# Patient Record
(demographics unavailable — no encounter records)

---

## 2018-08-19 NOTE — RAD
CHEST ONE VIEW: 

 

History: Chest pain, back pain. 

 

Comparison: 1-24-17

 

FINDINGS: 

Cardiac silhouette is magnified by projection. Pulmonary vasculature is unremarkable. Mediastinum is 
midline. No lobar consolidation or evidence of pneumothorax. 

 

IMPRESSION: 

No active cardiopulmonary abnormalities are demonstrated. 

 

POS: [facility]

## 2018-08-19 NOTE — CT
CT ARTERIOGRAM CHEST WITH IV CONTRAST AND 3D MIP IMAGING

CT ARTERIOGRAM ABDOMEN WITH IV CONTRAST AND 3D MIP IMAGING:

 

History: Chest and abdomen pain with radiation to the back. 

 

FINDINGS: 

There is good contrast opacification of the pulmonary arteries and the aorta without evidence of diss
ection or aneurysm. Calcifications apparent throughout the arterial structures. Mild scarring at the 
anterior lung bases. Gallbladder is surgically absent. 

 

Visceral artery to the abdomen are patent. Prominent degenerative changes of the thoracolumbar spine.
 

 

IMPRESSION: 

1. No CT evidence of aortic dissection. 

2. Atherosclerosis. 

3. Status post cholecystectomy.

 

POS: [facility]

## 2018-08-20 NOTE — SS
DATE OF ADMISSION:  08/20/2018

 

DATE OF DISCHARGE:  08/20/2018

 

PRIMARY CARE PHYSICIAN:  Dr. Jewel Matos.

 

CHIEF COMPLAINT:  Chest pain.

 

HISTORY OF PRESENT ILLNESS:  Ms. Brambila is a pleasant 81-year-old female with past medical history 
of hypertension, dyslipidemia, diabetes, osteoarthritis who presented to the emergency room with comp
laints of back pain.  History is mainly obtained by the patient herself.  Electronic medical records 
have been reviewed.

 

The patient has an extensive cardiac workup done last year including a cardiac catheterization, stres
s test, and transthoracic echocardiogram in 01/2017 by Dr. Marie.  All of that was unremarkable.

 

The patient reported that her valsartan was recalled and she has been tried on different medications 
since then and she feels that they are contributing to her back pain and high blood pressure.  She ha
s recently been on telmisartan and firmly believes that it is causing her back pain because she has r
ead it in the side effect profile on the internet.  The pain starts in the back and radiates to the l
eft side over her left anterior chest.  It is not associated with any shortness of breath, dizziness,
 lightheadedness.  She does not have any recent illnesses.  No fever, chills, cough or shortness of b
reath.

 

Upon presentation to the emergency room, she was hemodynamically stable and was admitted for further 
evaluation.

 

PAST MEDICAL HISTORY:

1.  Hypertension.

2.  Dyslipidemia.

3.  Osteoarthritis.

4.  Anxiety.

 

PAST SURGICAL HISTORY:  Cholecystectomy, hysterectomy, and cardiac ablation x2.

 

ALLERGIES:  CODEINE, KEFLEX, PENICILLIN, PREDNISONE, SULFA.

 

FAMILY HISTORY:  Significant for myocardial infarction in father and stroke in her mother.

 

SOCIAL HISTORY:  She is  and lives with her .  No history of drug, tobacco or alcohol a
buse.

 

CURRENT MEDICATIONS:  As follows, telmisartan 80 mg p.o. b.i.d., vitamin B12 daily, omega-3 daily, me
toprolol succinate 50 mg p.o. b.i.d., metformin 1000 mg daily, glyburide 7.5 mg daily, pravastatin 80
 mg daily, omeprazole 20 mg daily, metformin 500 mg at bedtime, aspirin 81 mg daily.

 

REVIEW OF SYSTEMS:  A 12-point review of systems was done.  It is negative except for those mentioned
 in the history and physical.

 

LABORATORY DATA:  CBC shows hemoglobin 11.6, otherwise unremarkable.  Serum chemistries unremarkable,
 blood sugar of 141.  Cardiac enzymes less than 0.010 for troponin x3.  Urinalysis showed trace leuko
cyte esterase.

 

CT scan with aortic dissection protocol done in the emergency room is negative for the same.  Chest x
-ray by my review has no acute effusion, edema or infiltrate.  A 12-lead EKG by my review shows no ac
[place] [address] or T-wave changes.  His normal sinus rhythm.

 

PHYSICAL EXAMINATION:

VITAL SIGNS:  Upon presentation, blood pressure 218/75, pulse of 87, respirations 17, saturating 97% 
on room air.

GENERAL:  No acute distress, awake, alert, oriented x3.

HEENT:  Mucous membrane is moist and pink.  No oropharyngeal exudate or erythema.  Head is normocepha
lic, atraumatic.  Pupils equal, reactive to light and accommodation.  Extraocular movement intact.

NECK:  Supple without any lymphadenopathy, JVD or bruit.

LUNGS:  Clear to auscultation without any wheezing, rales or rhonchi.

HEART:  Regular without any murmur, rubs or gallops.

ABDOMEN:  Soft, nontender, nondistended, positive bowel sounds.

EXTREMITIES:  Free of any cyanosis, clubbing, or edema.

NEUROLOGIC:  Nonfocal.

SKIN:  Free of any rashes or bruises.  I feel warm and dry.

PSYCHIATRIC:  Anxiety, noticed.

 

IMPRESSION:  Chest pain.  The patient has had undergone a nuclear medicine stress test as ordered by 
the night admitting physician.  It is negative for any acute reversible ischemia.  Her _____ 1.2, but
 with normal cardiac catheterization, last year it is insignificant.  At this time, most of her sympt
oms are due to uncontrolled hypertension.  I have encouraged her to restart her telmisartan and discu
ss this further with Dr. Jewel Matos.  She is to keep a blood pressure log and I am prescribing h
er clonidine as needed basis to take for systolic blood pressure more than 160.  She is a largely chiara
ssured and currently symptom free, so will be discharged.

 

Uncontrolled hypertension.  Her blood pressure has much improved and she will resume her home medicat
ions as instructed with p.r.n. clonidine.

 

DISPOSITION:  The patient is being discharged with close followup with primary care physician.

 

DISCHARGE DIAGNOSES:

1.  Chest pain, atypical

2.  Hypertensive urgency, resolved.

## 2018-08-20 NOTE — NM
NUCLEAR MEDICINE CARDIAC STRESS WITH EF AND WALL MOTION:

 

HISTORY: 

Chest pain.  

 

COMPARISON: 

None.

 

TECHNIQUE: 

The patient is administered 9.9 mCi of Technetium 99m sestamibi for rest imaging and 28.7 mCi of Tech
netium 99m sestamibi for stress imaging.  Cardiac gating is performed.

 

FINDINGS: 

On the attenuation-corrected images, there is homogeneous distribution of the radiotracer in the left
 ventricle.  No reversible or fixed defects.

 

TID is 1.20.

 

End-diastolic volume is 61 mL.

 

End-systolic volume is 13 mL.

 

CARDIAC GATING:

Normal motion and thickening.  78% ejection fraction.

 

IMPRESSION: 

 

1.  No reversibility or fixed defect.

 

2.  78% ejection fraction. 

 

POS: [facility]

## 2018-08-25 NOTE — EKG
Test Reason : 

Blood Pressure : ***/*** mmHG

Vent. Rate : 084 BPM     Atrial Rate : 084 BPM

   P-R Int : 170 ms          QRS Dur : 088 ms

    QT Int : 358 ms       P-R-T Axes : 023 -32 082 degrees

   QTc Int : 423 ms

 

*** Poor data quality, interpretation may be adversely affected

Normal sinus rhythm

Left axis deviation

Inferior infarct , age undetermined

Anterior infarct , age undetermined

Abnormal ECG

Similar to 26-APR-18

Confirmed by BRENNAN DAILEY ([id number]),  TWAN KELLY ([id number]) on 8/25/2018 1:56:04 PM

 

Referred By:             Confirmed By:BRENNAN DAILEY

## 2018-11-04 NOTE — HP
DATE OF ADMISSION:  2018

 

REASON FOR ADMISSION:  Acute gastroenteritis, severe dehydration, hyponatremia.

 

HISTORY OF PRESENT ILLNESS:  The patient gives history of having watery diarrhea from yesterday husam elliott.  She had nearly 10-12 times watery mucoid diarrhea.  No blood in it.  The patient also felt like 
her throat was tightening up last night and felt it is her dehydration with electrolyte issue.  She a
lso felt her tongue to be numb last night.  This morning again, the patient continued to have diarrhe
a and finally the family brought her here.  She has been able to drink water and also tried to eat so
me boiled eggs this morning.  She developed some shaking chills that is when the family decided to br
ing her to the emergency room.  No complaints of urinary frequency or urgency.  No cough or expectora
tion.  No fever.  Does not have any chest pain, palpitations or PND.  The patient had systolic blood 
pressures of 200 on arrival, likely due to anxiety.  She was given nitro paste and the pressure has c
ome down to 140 systolic at present.

 

PAST MEDICAL AND SURGICAL HISTORY:  History of diabetes mellitus type 2, hypertension, dyslipidemia, 
stress test done on 2018 showed no reversible or fixed defect, ejection fraction was 78%, osteo
arthritis, anxiety disorder, cholecystectomy, hysterectomy, prior ablation x2, has had coronary angio
gram done in 2017 by Dr. Rick Marie, which showed normal coronary anatomy.

 

CURRENT MEDICATIONS:  The patient is on telmisartan 160 mg p.o. daily, pravastatin 80 mg p.o. at bedt
eleni, glyburide 7.5 mg p.o. daily, metformin 1000 mg p.o. daily and 500 mg p.o. at bedtime, omeprazole
 20 mg daily, folic acid 0.4 mg p.o. daily.

 

ALLERGIES:  Allergic to CODEINE, IODINE, KEFLEX, PENICILLIN, PREDNISONE and SULFA.

 

PERSONAL HISTORY:  Does not abuse alcohol or drugs.  No history of smoking.

 

FAMILY HISTORY:  Mother  at the age of 83 years.  Father  at the age of 75 years and has had 
history of MI.

 

CODE STATUS:  Full.  Power of  is her .

 

REVIEW OF SYSTEMS:  The following complete review of systems was negative, unless otherwise mentioned
 in the HPI or below:  Constitutional:  Weight loss or gain, ability to conduct usual activities.  Sk
in:  Rash, itching.  Eyes:  Double vision, pain.  ENT/Mouth:  Nose bleeding, neck stiffness, pain, te
nderness.  Cardiovascular:  Palpitations, dyspnea on exertion, orthopnea.  Respiratory:  Shortness of
 breath, wheezing, cough, hemoptysis, fever or night sweats.  Gastrointestinal:  Poor appetite, abdom
inal pain, heartburn, nausea, vomiting, constipation, or diarrhea.  Genitourinary:  Urgency, frequenc
y, dysuria, nocturia.  Musculoskeletal:  Pain, swelling.  Neurologic/Psychiatric:  Anxiety, depressio
n.  Allergy/Immunologic:  Skin rash, bleeding tendency.

 

PHYSICAL EXAMINATION:

GENERAL:  The patient is an 81-year-old female who is currently not in any acute distress.

VITAL SIGNS:  Blood pressure 140/84, pulse 90 per minute, respiratory rate 18 per minute, temperature
 98.3 degrees Fahrenheit, saturating 98% on room air.

NECK:  Supple.  No elevated JVD.

HEENT:  Eyes, extraocular muscles intact.  Pupils reacting to light.  Oral cavity, mucous membranes a
re dry.  No exudates or congestion.

CARDIOVASCULAR:  S1, S2 heard.  Regular rhythm.

RESPIRATORY:  Air entry 1+ bilaterally.  No rales or rhonchi.

ABDOMEN:  Soft.  Bowel sounds heard.  No tenderness, rigidity or guarding.

EXTREMITIES:  No peripheral edema or calf tenderness.

VASCULAR SYSTEM:  Peripheral pulses 1+ bilateral.  No ischemic ulcerations or gangrene.

CENTRAL NERVOUS SYSTEM:  No gross focal deficits noted.  The patient is alert, awake, oriented well.

PSYCHIATRIC:  The patient's mood is euthymic.  No hallucinations or delusions.

 

LABORATORY AND X-RAY FINDINGS:  White count of 6.8, H and H 11 and 35, platelet count 244,000, MCV is
 100 with 67% neutrophils.  Sodium 125, serum bicarbonate 20, BUN 12, creatinine 0.7, serum glucose 1
70.  Liver enzymes are within normal limits.  Albumin is 4.0.  BNP is 92, troponin I less than 0.01, 
CK-MB 2.2, lipase is 28.  UA is negative for any infection.  EKG showed sinus rhythm at 79 beats per 
minute with nonspecific ST-T wave changes.

 

CLINICAL IMPRESSION AND PLAN:  The patient will be admitted to telemetry for initial hypertensive urg
ency when she came, likely due to anxiety.  Her systolic blood pressures are stabilized to 140s at pr
esent.  The patient has acute gastroenteritis, likely viral.  We will obtain stool cultures and C. di
ff.  No recent use of antibiotics.  She will be gently hydrated with normal saline at 100 mL per hour
.  The patient has hyponatremia with moderate-to-severe dehydration.  We will continue her aspirin, L
ipitor, metformin at 500 mg twice daily and telmisartan 80 mg twice daily.  We will continue to close
ly monitor her for any hemodynamic compromise.  Please note, the patient has had a normal coronary an
giogram done in 2017 and has had a recent stress test done in 2018, which were all within tong
l limits.

## 2018-11-05 NOTE — PDOC.PN
- Subjective


Encounter Start Date: 11/05/18


Encounter Start Time: 10:15


Subjective: diarrhea freq has come down, no nausea but has loss of appetite


-: no abd pain or chest pain or sob





- Objective


Resuscitation Status: 


 











Resuscitation Status           FULL:Full Resuscitation














MAR Reviewed: Yes


Vital Signs & Weight: 


 Vital Signs (12 hours)











  Temp Pulse Resp BP BP Pulse Ox


 


 11/05/18 08:00       94 L


 


 11/05/18 07:17  98.4 F  79  16   139/66  94 L


 


 11/05/18 05:26   87   190/77 H  


 


 11/05/18 04:00  97.9 F  87  20   190/77 H  96








 Weight











Weight                         152 lb 6.4 oz














I&O: 


 











 11/04/18 11/05/18 11/06/18





 06:59 06:59 06:59


 


Intake Total  1642 


 


Output Total  2400 


 


Balance  -758 











Result Diagrams: 


 11/05/18 04:22





 11/05/18 04:22


Additional Labs: 


 Accuchecks











  11/05/18 11/05/18 11/05/18





  10:51 05:51 00:44


 


POC Glucose  161 H  202 H  89














  11/04/18 11/04/18





  20:38 16:37


 


POC Glucose  107  65 L














Phys Exam





- Physical Examination


HEENT: PERRLA, moist MMs


Neck: no JVD, supple


Respiratory: no wheezing, no rales


Cardiovascular: RRR, no significant murmur


Gastrointestinal: soft, non-tender, positive bowel sounds


Musculoskeletal: no edema, pulses present


Neurological: non-focal, moves all 4 limbs


Psychiatric: normal affect, A&O x 3





Dx/Plan


(1) Gastroenteritis


Code(s): K52.9 - NONINFECTIVE GASTROENTERITIS AND COLITIS, UNSPECIFIED   Status

: Acute   





(2) Dehydration


Code(s): E86.0 - DEHYDRATION   Status: Acute   





(3) Hyponatremia


Code(s): E87.1 - HYPO-OSMOLALITY AND HYPONATREMIA   Status: Acute   





(4) DM type 2 (diabetes mellitus, type 2)


Status: Chronic   


Qualifiers: 


   Diabetes mellitus long term insulin use: without long term use   Diabetes 

mellitus complication status: with unspecified complications   Qualified Code(s)

: E11.8 - Type 2 diabetes mellitus with unspecified complications   





(5) HLD (hyperlipidemia)


Code(s): E78.5 - HYPERLIPIDEMIA, UNSPECIFIED   Status: Chronic   


Qualifiers: 


   Hyperlipidemia type: unspecified 





(6) HTN (hypertension)


Code(s): I10 - ESSENTIAL (PRIMARY) HYPERTENSION   Status: Chronic   


Qualifiers: 


   Hypertension type: essential hypertension 





- Plan


continue iv hydration for 1 more liter and stop


-: encourage po intake


-: stool for c.diff, campylobacter and shiga toxins are -ve


-: will dc antibiotics in am


-: may tx to med floor, likely viral gastroenteritis





* .








Review of Systems





- Medications/Allergies


Allergies/Adverse Reactions: 


 Allergies











Allergy/AdvReac Type Severity Reaction Status Date / Time


 


Sulfa (Sulfonamide Allergy Intermediate Rash Verified 11/01/13 18:19





Antibiotics)     


 


cephalexin [From Keflex] Allergy   Verified 01/21/17 02:15


 


codeine Allergy   Verified 05/28/16 14:21


 


Iodine and Iodide Containing Allergy   Verified 01/24/17 15:57





Produc     


 


Penicillins Allergy  Hives Verified 11/01/13 18:19


 


prednisone Allergy   Verified 05/27/16 10:10











Medications: 


 Current Medications





Acetaminophen (Tylenol)  650 mg PO Q4H PRN


   PRN Reason: Headache/Fever/Mild Pain (1-3)


Aspirin (Aspirin Chewable)  81 mg PO DAILY [facility]


   Last Admin: 11/05/18 09:10 Dose:  81 mg


Atorvastatin Calcium (Lipitor)  20 mg PO HS [facility]


   Last Admin: 11/04/18 20:35 Dose:  20 mg


Dextrose/Water (Dextrose 50%)  25 gm SLOW IVP PRN PRN


   PRN Reason: Hypoglycemia


Enoxaparin Sodium (Lovenox)  40 mg SC 0900 [facility]


   Last Admin: 11/05/18 09:10 Dose:  40 mg


Famotidine (Pepcid)  20 mg PO BID [facility]


   Last Admin: 11/05/18 09:11 Dose:  20 mg


Glucagon (Glucagon)  1 mg IM PRN PRN


   PRN Reason: Hypoglycemia


Guaifenesin/Dextromethorphan (Robitussin Dm)  15 ml PO Q4H PRN


   PRN Reason: Cough


Dextrose/Water (D5w)  1,000 mls @ 0 mls/hr IV .Q0M PRN


   PRN Reason: Hypoglycemia


Sodium Chloride (Normal Saline 0.9%)  1,000 mls @ 100 mls/hr IV .Q10H [facility]


   Last Admin: 11/04/18 23:25 Dose:  1,000 mls


Ciprofloxacin/Dextrose 400 mg/ (Device)  200 mls @ 200 mls/hr IVPB 0300,1500 [facility]


   Last Admin: 11/05/18 03:01 Dose:  200 mls


Metronidazole 500 mg/ Device  100 mls @ 100 mls/hr IVPB 0800,1600,2359 [facility]


   Last Admin: 11/05/18 09:10 Dose:  100 mls


Insulin Human Lispro (Humalog)  0 units SC .MODERATE SLIDING SC PRN


   PRN Reason: Moderate Correctional Scale


Insulin Human Lispro (Humalog)  0 units SC .BEDTIME SLIDING SC PRN


   PRN Reason: Bedtime Correctional Scale


Labetalol HCl (Normodyne)  10 mg SLOW IVP Q4H PRN


   PRN Reason: Systolic BP > 180


   Last Admin: 11/05/18 05:26 Dose:  10 mg


Metformin HCl (Glucophage)  500 mg PO BID-[facility]


   Last Admin: 11/05/18 09:09 Dose:  500 mg


Sodium Chloride (Flush - Normal Saline)  10 ml IVF Q12HR [facility]


   Last Admin: 11/05/18 10:22 Dose:  Not Given


Sodium Chloride (Flush - Normal Saline)  10 ml IVF PRN PRN


   PRN Reason: Saline Flush


Telmisartan (Micardis)  40 mg PO DAILY [facility]


   Last Admin: 11/05/18 09:11 Dose:  40 mg

## 2018-11-06 NOTE — DIS
DATE OF ADMISSION:  11/04/2018

 

DATE OF DISCHARGE:  11/06/2018

 

DISCHARGE DISPOSITION:  To home.

 

PRIMARY DISCHARGE DIAGNOSES:  Viral gastroenteritis, resolved and hyponatremia with moderate-to-sever
e dehydration, resolved.

 

SECONDARY DISCHARGE DIAGNOSES:  Diabetes mellitus type 2, dyslipidemia, hypertension.

 

PROCEDURES DONE DURING HOSPITALIZATION:  The patient has had stool studies including Clostridium diff
icile, Campylobacter and Shiga toxin, which were all negative.  Urine culture grew mixed yesy less t
centeno 10,000.  H and H 12 and 36, platelet count 262,000.  Discharge sodium level is 134, BUN 6, creati
nine 0.7 with serum bicarbonate of 22 on the day of discharge.  Admitting sodium levels were 125 with
 serum bicarbonate of 20.

 

DISCHARGE MEDICATIONS:  Aspirin 81 mg p.o. daily, vitamin B12 400 mcg p.o. daily, folic acid 0.4 mg p
.o. daily, glyburide 7.5 mg p.o. daily, metformin 500 mg p.o. at bedtime and 1000 mg p.o. q.a.m., met
oprolol succinate 50 mg twice daily, omega-3 fatty acid 350 mg p.o. daily, omeprazole 20 mg p.o. toney
y, Pravachol 80 mg p.o. at bedtime, telmisartan 80 mg p.o. twice daily.

 

ALLERGIES:  Allergic to multiple agents including SULFA, KEFLEX, CODEINE, IODINE, PENICILLIN and PRED
NISONE.

 

DISCHARGE PLAN:  The patient to follow up with primary care physician in 1 week.

 

BRIEF COURSE DURING HOSPITALIZATION:  The patient initially came to ER with complaints of severe diar
redd nearly 10-12 times, which was watery.  She had severe dehydration with hyponatremia.  The patien
t was gently hydrated during her stay here.  Stool studies have been negative for bacterial etiology.
  She was negative for Clostridium difficile as well.  She was initially placed on Cipro and Flagyl, 
which has been discontinued.  The patient developed metallic taste in her mouth with sometimes salty 
sensation as well on her tongue due to Flagyl.  All of her antibiotics have been discontinued.  She i
s tolerating oral solid diet and will be shortly discharged home.  Her electrolytes have stabilized p
rior to discharge.  She is ambulating and eating well prior to discharge.  She needs to follow up wit
h her primary care physician in 1 week.  The patient likely had viral gastroenteritis, which is resol
guillaume.  She had one stool in the last 24 hours prior to discharge.

 

Please see a face-to-face documentation on [facility] for the day of discharge.

## 2018-11-06 NOTE — PDOC.PN
- Subjective


Encounter Start Date: 11/06/18


Encounter Start Time: 07:50


Subjective: feels better


-: no further diarrhea or nausea


-: has metallic/salty taste to her tongue





- Objective


Resuscitation Status: 


 











Resuscitation Status           FULL:Full Resuscitation














MAR Reviewed: Yes


Vital Signs & Weight: 


 Vital Signs (12 hours)











  Temp Pulse Resp BP BP Pulse Ox


 


 11/06/18 11:28  98.4 F  90  19  183/75 H   99


 


 11/06/18 10:52  98.6 F  87  16  147/70 H   98


 


 11/06/18 07:02  98.4 F  71  20  157/63 H   99


 


 11/06/18 04:00  98.0 F  65  20   156/78 H  97


 


 11/06/18 00:00  98.3 F  71  20   150/54 H  98








 Weight











Admit Weight                   148 lb 1.6 oz


 


Weight                         152 lb 6.4 oz














I&O: 


 











 11/05/18 11/06/18 11/07/18





 06:59 06:59 06:59


 


Intake Total 1642 1550 


 


Output Total 2400  


 


Balance -758 1550 











Result Diagrams: 


 11/06/18 08:47





 11/06/18 08:47


Additional Labs: 


 Accuchecks











  11/06/18 11/05/18 11/05/18





  00:36 19:32 15:42


 


POC Glucose  121 H  173 H  89














Phys Exam





- Physical Examination


HEENT: PERRLA, moist MMs


Neck: no JVD, supple


Respiratory: no wheezing, no rales


Cardiovascular: RRR, no significant murmur


Gastrointestinal: soft, non-tender, no distention, positive bowel sounds


Musculoskeletal: no edema, pulses present


Neurological: non-focal, moves all 4 limbs


Psychiatric: normal affect, A&O x 3





Dx/Plan


(1) Gastroenteritis


Code(s): K52.9 - NONINFECTIVE GASTROENTERITIS AND COLITIS, UNSPECIFIED   Status

: Resolved   





(2) Dehydration


Code(s): E86.0 - DEHYDRATION   Status: Resolved   





(3) Hyponatremia


Code(s): E87.1 - HYPO-OSMOLALITY AND HYPONATREMIA   Status: Resolved   





(4) DM type 2 (diabetes mellitus, type 2)


Status: Chronic   


Qualifiers: 


   Diabetes mellitus long term insulin use: without long term use   Diabetes 

mellitus complication status: with unspecified complications   Qualified Code(s)

: E11.8 - Type 2 diabetes mellitus with unspecified complications   





(5) HLD (hyperlipidemia)


Code(s): E78.5 - HYPERLIPIDEMIA, UNSPECIFIED   Status: Chronic   


Qualifiers: 


   Hyperlipidemia type: unspecified 





(6) HTN (hypertension)


Code(s): I10 - ESSENTIAL (PRIMARY) HYPERTENSION   Status: Chronic   


Qualifiers: 


   Hypertension type: essential hypertension 





- Plan


likely had viral gastroenteritis


-: stool tests are -ve for bacterial etiology


-: electrolytes are stable


-: dc pt home


-: d/w daughter in law over phone





* .